# Patient Record
Sex: FEMALE | Race: WHITE
[De-identification: names, ages, dates, MRNs, and addresses within clinical notes are randomized per-mention and may not be internally consistent; named-entity substitution may affect disease eponyms.]

---

## 2023-12-22 PROBLEM — Z00.00 ENCOUNTER FOR PREVENTIVE HEALTH EXAMINATION: Status: ACTIVE | Noted: 2023-12-22

## 2023-12-26 ENCOUNTER — APPOINTMENT (OUTPATIENT)
Dept: ORTHOPEDIC SURGERY | Facility: CLINIC | Age: 63
End: 2023-12-26
Payer: COMMERCIAL

## 2023-12-26 VITALS — BODY MASS INDEX: 23.93 KG/M2 | WEIGHT: 114 LBS | HEIGHT: 58 IN

## 2023-12-26 DIAGNOSIS — M25.511 PAIN IN RIGHT SHOULDER: ICD-10-CM

## 2023-12-26 DIAGNOSIS — M54.12 RADICULOPATHY, CERVICAL REGION: ICD-10-CM

## 2023-12-26 DIAGNOSIS — E78.00 PURE HYPERCHOLESTEROLEMIA, UNSPECIFIED: ICD-10-CM

## 2023-12-26 DIAGNOSIS — I10 ESSENTIAL (PRIMARY) HYPERTENSION: ICD-10-CM

## 2023-12-26 PROCEDURE — 72040 X-RAY EXAM NECK SPINE 2-3 VW: CPT

## 2023-12-26 PROCEDURE — 99204 OFFICE O/P NEW MOD 45 MIN: CPT

## 2023-12-26 RX ORDER — DARIDOREXANT 50 MG/1
50 TABLET, FILM COATED ORAL
Refills: 0 | Status: ACTIVE | COMMUNITY

## 2023-12-26 RX ORDER — SIMVASTATIN 80 MG/1
TABLET, FILM COATED ORAL
Refills: 0 | Status: ACTIVE | COMMUNITY

## 2023-12-26 RX ORDER — EZETIMIBE 10 MG/1
10 TABLET ORAL
Refills: 0 | Status: ACTIVE | COMMUNITY

## 2023-12-26 RX ORDER — OLMESARTAN MEDOXOMIL 20 MG/1
20 TABLET, FILM COATED ORAL
Refills: 0 | Status: ACTIVE | COMMUNITY

## 2023-12-26 RX ORDER — LEVOTHYROXINE SODIUM 137 UG/1
TABLET ORAL
Refills: 0 | Status: ACTIVE | COMMUNITY

## 2023-12-26 NOTE — PHYSICAL EXAM
[Extension] : extension [Rotation to right] : rotation to right [Right] : right shoulder [] : negative impingement testing

## 2023-12-26 NOTE — ASSESSMENT
[FreeTextEntry1] : I don't see any structural issues with her shoulder Needs MRI cervical to evaluate for HNP/stenosis- pain likely radicular

## 2023-12-26 NOTE — HISTORY OF PRESENT ILLNESS
[10] : 10 [Shooting] : shooting [Nothing helps with pain getting better] : Nothing helps with pain getting better [de-identified] : Has had right shoulder pain down upper arm for past 3 months. No injury. She was seen elsewhere, had CSI, PT without help, had MRI which only showed AC OA, mild tendinopathy, no tears. Here for another opinion. Some neck soreness at times [] : no [FreeTextEntry1] : RT Shoulder [FreeTextEntry5] : 64 yo F with right shoulder pain since September 2023. She had been seeing someone at [facility] and had an MRI done. She has had PT, Mobic, MDP, and CSI with mild relief. No specific injury. [FreeTextEntry7] : To the elbow [de-identified] : Carrying, lifting  [de-identified] : MRI

## 2024-01-12 ENCOUNTER — APPOINTMENT (OUTPATIENT)
Dept: MRI IMAGING | Facility: CLINIC | Age: 64
End: 2024-01-12

## 2024-01-16 ENCOUNTER — APPOINTMENT (OUTPATIENT)
Dept: ORTHOPEDIC SURGERY | Facility: CLINIC | Age: 64
End: 2024-01-16

## 2024-06-11 ENCOUNTER — APPOINTMENT (OUTPATIENT)
Dept: ORTHOPEDIC SURGERY | Facility: CLINIC | Age: 64
End: 2024-06-11

## 2024-06-11 VITALS — BODY MASS INDEX: 23.93 KG/M2 | WEIGHT: 114 LBS | HEIGHT: 58 IN

## 2024-06-11 DIAGNOSIS — M75.41 IMPINGEMENT SYNDROME OF RIGHT SHOULDER: ICD-10-CM

## 2024-06-11 PROCEDURE — 20610 DRAIN/INJ JOINT/BURSA W/O US: CPT | Mod: RT

## 2024-06-11 PROCEDURE — 99213 OFFICE O/P EST LOW 20 MIN: CPT | Mod: 25

## 2024-06-11 NOTE — REASON FOR VISIT
[FreeTextEntry2] : 06/11/2024 Has worsening pain right shoulder with certain movements. No new injury. Only slight right sided neck soreness

## 2024-06-11 NOTE — HISTORY OF PRESENT ILLNESS
[10] : 10 [Shooting] : shooting [Nothing helps with pain getting better] : Nothing helps with pain getting better [de-identified] : Has had right shoulder pain down upper arm for past 3 months. No injury. She was seen elsewhere, had CSI, PT without help, had MRI which only showed AC OA, mild tendinopathy, no tears. Here for another opinion. Some neck soreness at times [] : no [FreeTextEntry1] : RT Shoulder [FreeTextEntry5] : 64 yo F with right shoulder pain since September 2023. She had been seeing someone at [facility] and had an MRI done. She has had PT, Mobic, MDP, and CSI with mild relief. No specific injury. [FreeTextEntry7] : To the elbow [de-identified] : Carrying, lifting  [de-identified] : MRI

## 2024-06-11 NOTE — PROCEDURE
[Large Joint Injection] : Large joint injection [Right] : of the right [Subacromial Space] : subacromial space [Pain] : pain [Alcohol] : alcohol [Betadine] : betadine [Ethyl Chloride sprayed topically] : ethyl chloride sprayed topically [Sterile technique used] : sterile technique used [___ cc    3mg] :  Betamethasone (Celestone) ~Vcc of 3mg [___ cc    1%] : Lidocaine ~Vcc of 1%